# Patient Record
Sex: MALE | Race: WHITE | Employment: UNEMPLOYED | URBAN - METROPOLITAN AREA
[De-identification: names, ages, dates, MRNs, and addresses within clinical notes are randomized per-mention and may not be internally consistent; named-entity substitution may affect disease eponyms.]

---

## 2017-01-04 ENCOUNTER — OFFICE VISIT (OUTPATIENT)
Dept: PHYSICAL THERAPY | Facility: HOSPITAL | Age: 57
End: 2017-01-04
Attending: ORTHOPAEDIC SURGERY
Payer: MEDICAID

## 2017-01-04 PROCEDURE — 97140 MANUAL THERAPY 1/> REGIONS: CPT

## 2017-01-04 PROCEDURE — 97112 NEUROMUSCULAR REEDUCATION: CPT

## 2017-01-04 PROCEDURE — 97110 THERAPEUTIC EXERCISES: CPT

## 2017-01-04 NOTE — PROGRESS NOTES
Dx: C-spine radiculopathy s/p ACDF (11/8/16)         Authorized # of Visits:  4/7         Next MD visit: 1/17/17  Fall Risk: standard         Precautions: n/a            Medication Changes since last visit?: No    Subjective:    It took 4 days two recover f Sxs to allow for patient to perform activities without deviation asleep at night without disturbance from pain.  FAIR PROGRESS  5.  Independent with progressive HEP during and upon discharge from therapy to aide with Sx management and progress toward greate

## 2017-01-06 ENCOUNTER — OFFICE VISIT (OUTPATIENT)
Dept: PHYSICAL THERAPY | Facility: HOSPITAL | Age: 57
End: 2017-01-06
Attending: ORTHOPAEDIC SURGERY
Payer: MEDICAID

## 2017-01-06 PROCEDURE — 97140 MANUAL THERAPY 1/> REGIONS: CPT

## 2017-01-06 PROCEDURE — 97110 THERAPEUTIC EXERCISES: CPT

## 2017-01-06 NOTE — PROGRESS NOTES
Dx: C-spine radiculopathy s/p ACDF (11/8/16)         Authorized # of Visits:  5/7         Next MD visit: 1/17/17  Fall Risk: standard         Precautions: n/a            Medication Changes since last visit?: No    Subjective: \"It's been 3 days since I've sitting posture/tolerance to activity--IN PROGRESS  3. Patient able to sit using proper posture for up to or > 1 hour  to allow for patient to sit and watch a television show without increased pain--IN PROGRESS  4.  Minimal to no peripheralization of Sxs to

## 2017-01-11 ENCOUNTER — APPOINTMENT (OUTPATIENT)
Dept: PHYSICAL THERAPY | Facility: HOSPITAL | Age: 57
End: 2017-01-11
Attending: ORTHOPAEDIC SURGERY
Payer: MEDICAID

## 2017-01-19 ENCOUNTER — OFFICE VISIT (OUTPATIENT)
Dept: PHYSICAL THERAPY | Facility: HOSPITAL | Age: 57
End: 2017-01-19
Attending: ORTHOPAEDIC SURGERY
Payer: MEDICAID

## 2017-01-19 PROCEDURE — 97140 MANUAL THERAPY 1/> REGIONS: CPT

## 2017-01-19 PROCEDURE — 97110 THERAPEUTIC EXERCISES: CPT

## 2017-01-19 NOTE — PROGRESS NOTES
Dx: C-spine radiculopathy s/p ACDF (11/8/16)         Authorized # of Visits:  6/7         Next MD visit: 2/7/17  Fall Risk: standard         Precautions: n/a            Medication Changes since last visit?: YES    Subjective: \"I only slept for 10 hours fo progress toward greater/painfree functional. [place] --IN PROGRESS    Plan: Continue to monitor and progress as appropriate. Skilled Services:  Reassessment    Charges:  TherEx 1, MT x 2             Total Timed Treatment: 45 min

## 2017-01-20 ENCOUNTER — APPOINTMENT (OUTPATIENT)
Dept: PHYSICAL THERAPY | Facility: HOSPITAL | Age: 57
End: 2017-01-20
Attending: ORTHOPAEDIC SURGERY
Payer: MEDICAID

## 2017-01-23 ENCOUNTER — OFFICE VISIT (OUTPATIENT)
Dept: PHYSICAL THERAPY | Facility: HOSPITAL | Age: 57
End: 2017-01-23
Attending: ORTHOPAEDIC SURGERY
Payer: MEDICAID

## 2017-01-23 PROCEDURE — 97110 THERAPEUTIC EXERCISES: CPT

## 2017-01-23 NOTE — PROGRESS NOTES
Dx: C-spine radiculopathy s/p ACDF (11/8/16)         Authorized # of Visits: 7/7         Next MD visit: 2/7/17  Fall Risk: standard         Precautions: n/a            Medication Changes since last visit?: YES    Subjective:  \"I went on a vitamin kick and remain the same. Overall minimal functional improvement noted to date. Goals:    1.  Improve C-spine mobility by 10-15 degrees so that patient may have sufficient range to perform daily activities/drive without increased pain or deviation-- IN PROGRESS

## 2017-01-24 NOTE — PROGRESS NOTES
Patient Name: Rossy Ventura, : 1960, MRN: [id number]   Date:  2017  Referring Physician:  Florencio Patterson    Diagnosis:  C-spine radiculopathy s/p ACDF (16)            Progress Summary    Pt has attended 7 visits in Physical Therapy. patient may have sufficient range to perform daily activities/drive without increased pain or deviation-- IN PROGRESS  2. Improve Postural awareness to facilitate symptom management and allow for proper sitting posture/tolerance to activity--MET  3.  Patien

## 2017-01-26 ENCOUNTER — APPOINTMENT (OUTPATIENT)
Dept: PHYSICAL THERAPY | Facility: HOSPITAL | Age: 57
End: 2017-01-26
Attending: ORTHOPAEDIC SURGERY
Payer: MEDICAID

## 2017-01-30 ENCOUNTER — APPOINTMENT (OUTPATIENT)
Dept: PHYSICAL THERAPY | Facility: HOSPITAL | Age: 57
End: 2017-01-30
Attending: ORTHOPAEDIC SURGERY
Payer: MEDICAID

## 2017-03-29 ENCOUNTER — HOSPITAL ENCOUNTER (OUTPATIENT)
Dept: MRI IMAGING | Facility: HOSPITAL | Age: 57
Discharge: HOME | End: 2017-03-29
Attending: ORTHOPAEDIC SURGERY
Payer: OTHER MISCELLANEOUS

## 2017-03-29 DIAGNOSIS — M54.10 RADICULOPATHY, UNSPECIFIED SPINAL REGION: ICD-10-CM

## 2017-03-29 PROCEDURE — 72156 MRI NECK SPINE W/O & W/DYE: CPT

## 2017-03-29 PROCEDURE — A9575 INJ GADOTERATE MEGLUMI 0.1ML: HCPCS | Performed by: ORTHOPAEDIC SURGERY

## 2017-05-22 ENCOUNTER — OFFICE VISIT (OUTPATIENT)
Dept: PAIN CLINIC | Facility: HOSPITAL | Age: 57
End: 2017-05-22
Attending: ANESTHESIOLOGY
Payer: COMMERCIAL

## 2017-05-22 VITALS
HEIGHT: 66.5 IN | BODY MASS INDEX: 30.17 KG/M2 | WEIGHT: 190 LBS | RESPIRATION RATE: 18 BRPM | SYSTOLIC BLOOD PRESSURE: 122 MMHG | HEART RATE: 96 BPM | DIASTOLIC BLOOD PRESSURE: 79 MMHG

## 2017-05-22 DIAGNOSIS — M50.30 DDD (DEGENERATIVE DISC DISEASE), CERVICAL: ICD-10-CM

## 2017-05-22 DIAGNOSIS — M96.1 POSTLAMINECTOMY SYNDROME, CERVICAL: ICD-10-CM

## 2017-05-22 DIAGNOSIS — M54.12 CERVICAL RADICULOPATHY: Primary | ICD-10-CM

## 2017-05-22 PROCEDURE — 99201 HC OUTPT EVAL AND MGNT NEW PT LEVEL 1: CPT

## 2017-05-22 RX ORDER — CARISOPRODOL 350 MG/1
350 TABLET ORAL 3 TIMES DAILY PRN
COMMUNITY
End: 2017-06-26

## 2017-05-22 RX ORDER — GABAPENTIN 300 MG/1
300 CAPSULE ORAL 3 TIMES DAILY
Qty: 90 CAPSULE | Refills: 0 | Status: SHIPPED | OUTPATIENT
Start: 2017-05-22 | End: 2017-06-14

## 2017-05-22 RX ORDER — HYDROCODONE BITARTRATE AND ACETAMINOPHEN 10; 325 MG/1; MG/1
1 TABLET ORAL EVERY 6 HOURS PRN
Qty: 120 TABLET | Refills: 0 | Status: SHIPPED | OUTPATIENT
Start: 2017-05-22 | End: 2017-06-14

## 2017-05-22 RX ORDER — TRAMADOL HYDROCHLORIDE 50 MG/1
100 TABLET ORAL EVERY 8 HOURS PRN
COMMUNITY
End: 2017-05-22

## 2017-05-22 NOTE — PROGRESS NOTES
discectomy and decompression and fusion at the cervical spine by Dr. Arti Paez 11/2016    Alexx Bowser 89    05/22/17  PRESENTS AMBULATORY TO CPM;  NEW CONSULT C/O CERVICAL NECK PAIN RADICULOPATHY LT SHLDR & DOWN LEXIE

## 2017-05-22 NOTE — PROGRESS NOTES
Recommend continuing physical therapy as recommended by Dr. Emerson Mathur Will start gabapentin 300mg TID, instructions given for titration.  Will restart norco 10/325 up to 4 tabs per day.  Patient instructed to discontinue tramadol.  Patient to follow up in

## 2017-05-22 NOTE — CHRONIC PAIN
Initial Consultation Note      HISTORY OF PRESENT ILLNESS:  Eze Wyatt is a 61year old old male referred to the pain clinic by Dr. Kimmie Moon for neck pain . The pain radiates into the upper extremity.   He reports he had an initial surgery with good re bleeding  Lymphatic: No current lymphedema  Allergic/Immunologic:  Negative  Musculoskeletal: As above  Neurological: As above      MEDICAL HISTORY:  Patient Active Problem List:     Nasal septal deviation     Hypertrophy, nasal, turbinate     Stenosis of Extensors 5/5 5/5   Intrinsic Hand 5/5 5/5    5/5 5/5     LOWER EXTREMITY      LEFT RIGHT   Iliopsoas 5/5 5/5   Quadriceps 5/5 5/5   Foot DF 5/5 5/5   Foot EHL 5/5 5/5   Gastrocnemius 5/5 5/5   Hoffmans: negative    Joint Exam: Normal  TPs:  Present wi which deforms the thecal sac, without cord signal or cord enhancement. There is moderate canal narrowing. C5-C6:  Fused. Minimal endplate osteophyte formation. No significant central canal or neuroforaminal narrowing. C6-C7:  Fused.  Minimal end plate ost up in 1 month, sooner if warranted. If opioids continued, would need to sign an opioid agreement at that time, as well has obtaining a urine drug screen.

## 2017-06-15 RX ORDER — GABAPENTIN 300 MG/1
300 CAPSULE ORAL 3 TIMES DAILY
Qty: 90 CAPSULE | Refills: 2 | Status: SHIPPED | OUTPATIENT
Start: 2017-06-16 | End: 2017-08-01

## 2017-06-15 RX ORDER — HYDROCODONE BITARTRATE AND ACETAMINOPHEN 10; 325 MG/1; MG/1
1 TABLET ORAL EVERY 6 HOURS PRN
Qty: 120 TABLET | Refills: 0 | Status: SHIPPED | OUTPATIENT
Start: 2017-06-16 | End: 2017-06-26

## 2017-06-26 NOTE — PROGRESS NOTES
discectomy and decompression and fusion at the cervical spine by Dr. Reyna Hoyos 11/2016    Alexx Bowser 89    05/22/17  PRESENTS AMBULATORY TO CPM;  NEW CONSULT C/O CERVICAL NECK PAIN RADICULOPATHY LT SHLDR & DOWN LEXIE

## 2017-06-26 NOTE — CHRONIC PAIN
[facility]: Follow up Note      HISTORY OF PRESENT ILLNESS:  Sulma Olivia is a 61year old old male referred to the pain clinic by Dr. Ken Cam for neck pain . The pain radiates into the upper extremity.   He reports he had an initial blossom List:     Nasal septal deviation     Hypertrophy, nasal, turbinate     Stenosis of cervical spine with myelopathy    Past Medical History:   Diagnosis Date   • Other and unspecified hyperlipidemia        FAMILY HISTORY:  Family History   Problem Relation A gadolinium injection.             FINDINGS:         CRANIOCERVICAL AREA:       Normal foramen magnum with no Chiari malformation.     PARASPINAL AREA:    Normal with no visible mass.     BONES:           Prior history cervical discectomy fusion at C5-C6, w 4.15 (L) 11/09/2016   HGB 12.6 (L) 11/09/2016   HCT 36.5 (L) 11/09/2016   MCV 87.9 11/09/2016   MCH 30.3 11/09/2016   MCHC 34.5 11/09/2016   RDW 13.1 11/09/2016    11/09/2016   MPV 7.3 (L) 11/09/2016       Lab Results  Component Value Date

## 2017-07-19 ENCOUNTER — TELEPHONE (OUTPATIENT)
Dept: PAIN CLINIC | Facility: HOSPITAL | Age: 57
End: 2017-07-19

## 2017-08-02 RX ORDER — GABAPENTIN 300 MG/1
300 CAPSULE ORAL 3 TIMES DAILY
Qty: 90 CAPSULE | Refills: 2 | Status: SHIPPED | OUTPATIENT
Start: 2017-08-02 | End: 2018-01-08

## 2017-08-21 ENCOUNTER — OFFICE VISIT (OUTPATIENT)
Dept: PAIN CLINIC | Facility: HOSPITAL | Age: 57
End: 2017-08-21
Attending: NURSE PRACTITIONER
Payer: COMMERCIAL

## 2017-08-21 DIAGNOSIS — G99.2 STENOSIS OF CERVICAL SPINE WITH MYELOPATHY (HCC): Primary | ICD-10-CM

## 2017-08-21 DIAGNOSIS — M48.02 STENOSIS OF CERVICAL SPINE WITH MYELOPATHY (HCC): Primary | ICD-10-CM

## 2017-08-21 PROCEDURE — 99211 OFF/OP EST MAY X REQ PHY/QHP: CPT

## 2017-08-21 RX ORDER — HYDROCODONE BITARTRATE AND ACETAMINOPHEN 10; 325 MG/1; MG/1
1 TABLET ORAL EVERY 6 HOURS PRN
Qty: 120 TABLET | Refills: 0 | Status: SHIPPED | OUTPATIENT
Start: 2017-09-20 | End: 2017-10-03

## 2017-08-21 RX ORDER — HYDROCODONE BITARTRATE AND ACETAMINOPHEN 10; 325 MG/1; MG/1
1 TABLET ORAL EVERY 6 HOURS PRN
Qty: 120 TABLET | Refills: 0 | Status: SHIPPED | OUTPATIENT
Start: 2017-08-21 | End: 2017-09-20

## 2017-08-21 NOTE — PROGRESS NOTES
Patient presents to [facility] ambulatory for med eval.  We have been treating him for neck pain that radiates to his left shoulder and down his left arm with numbness and tingling.   His pain is a 7/10 today and he just took his norco,  May not have kicked in yet

## 2017-08-21 NOTE — CHRONIC PAIN
[facility]: Follow up Note    HISTORY OF PRESENT ILLNESS:  Aniyah Eastman is a 61year old old male referred to the pain clinic by Dr. Ruth Quiñonez for neck pain. He continues to report neck pain that radiates into the upper extremity.    He sta Stenosis of cervical spine with myelopathy    Past Medical History:   Diagnosis Date   • Other and unspecified hyperlipidemia        FAMILY HISTORY:  Family History   Problem Relation Age of Onset   • Other[other] [OTHER] Father      Renal transplant   • D no visible mass.     BONES:           Prior history cervical discectomy fusion at C5-C6, with evidence for prior hardware removal from C7. The hardware causes susceptibility artifact limiting assessment of adjacent structures.  There is straightening of nor 208 11/09/2016   MPV 7.3 (L) 11/09/2016       Lab Results  Component Value Date    (L) 11/09/2016   K 3.8 11/09/2016    11/09/2016   CO2 26 11/09/2016   BUN 13 11/09/2016    (H) 11/09/2016   CA 8.6 11/09/2016     No results found for: PT

## 2017-10-04 RX ORDER — METHYLPREDNISOLONE 4 MG/1
TABLET ORAL
Qty: 1 PACKAGE | Refills: 0 | Status: SHIPPED | OUTPATIENT
Start: 2017-10-04 | End: 2017-12-15 | Stop reason: ALTCHOICE

## 2017-10-04 RX ORDER — HYDROCODONE BITARTRATE AND ACETAMINOPHEN 10; 325 MG/1; MG/1
1 TABLET ORAL EVERY 6 HOURS PRN
Qty: 120 TABLET | Refills: 0 | Status: SHIPPED | OUTPATIENT
Start: 2017-10-19 | End: 2017-10-19

## 2017-10-19 ENCOUNTER — OFFICE VISIT (OUTPATIENT)
Dept: PAIN CLINIC | Facility: HOSPITAL | Age: 57
End: 2017-10-19
Attending: NURSE PRACTITIONER
Payer: COMMERCIAL

## 2017-10-19 VITALS
DIASTOLIC BLOOD PRESSURE: 80 MMHG | BODY MASS INDEX: 30.17 KG/M2 | HEIGHT: 66.6 IN | RESPIRATION RATE: 18 BRPM | WEIGHT: 190 LBS | SYSTOLIC BLOOD PRESSURE: 120 MMHG

## 2017-10-19 DIAGNOSIS — M48.02 STENOSIS OF CERVICAL SPINE WITH MYELOPATHY (HCC): Primary | ICD-10-CM

## 2017-10-19 DIAGNOSIS — G99.2 STENOSIS OF CERVICAL SPINE WITH MYELOPATHY (HCC): Primary | ICD-10-CM

## 2017-10-19 PROCEDURE — 99211 OFF/OP EST MAY X REQ PHY/QHP: CPT

## 2017-10-19 RX ORDER — HYDROCODONE BITARTRATE AND ACETAMINOPHEN 10; 325 MG/1; MG/1
1 TABLET ORAL EVERY 6 HOURS PRN
Qty: 120 TABLET | Refills: 0 | Status: SHIPPED | OUTPATIENT
Start: 2017-11-18 | End: 2017-12-15

## 2017-10-19 RX ORDER — HYDROCODONE BITARTRATE AND ACETAMINOPHEN 10; 325 MG/1; MG/1
1 TABLET ORAL EVERY 6 HOURS PRN
Qty: 120 TABLET | Refills: 0 | Status: SHIPPED | OUTPATIENT
Start: 2017-10-19 | End: 2017-11-18

## 2017-10-19 RX ORDER — PREGABALIN 50 MG/1
50 CAPSULE ORAL 3 TIMES DAILY
Qty: 90 CAPSULE | Refills: 0 | Status: SHIPPED | OUTPATIENT
Start: 2017-10-19 | End: 2017-11-18

## 2017-10-19 NOTE — CHRONIC PAIN
MEDICATION EVALUATION  HISTORY OF PRESENT ILLNESS:  Lydia Hill is a 61year old old male with history of Stenosis of cervical spine with myelopathy  (primary encounter diagnosis) who returns for medication evaluation.  Patient continues to report neck above  Coughing/sneezing/straining does not exacerbate the pain.   Numbness/tingling: as above  Weakness: as above  Weight Loss: Negative   Fever: Negative   Cardiovascular:  No current chest pain or palpitations   Respiratory:  No current shortness of nadia 10/19/17  1156   BP: 120/80   Resp: 18      General: Alert and oriented x3  Affect:  NAD  Gait: Normal;  cane user - No  Spine: Normal    ROM:   CERVICAL SPINE    Degree Pain   Flexion 20 Yes    Extension 10 yes                       MOTOR EXAMINATION:  UP Recommended integrative medicine clinic- referral given  7. Discussed injection therapy. Patient will switch insurance in the next month. Will try to get injection approved after that.           Pt will return to clinic for follow up before the next medicat
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
## 2017-12-15 ENCOUNTER — OFFICE VISIT (OUTPATIENT)
Dept: PAIN CLINIC | Facility: HOSPITAL | Age: 57
End: 2017-12-15
Attending: NURSE PRACTITIONER
Payer: COMMERCIAL

## 2017-12-15 VITALS
SYSTOLIC BLOOD PRESSURE: 118 MMHG | BODY MASS INDEX: 28.93 KG/M2 | HEIGHT: 66 IN | HEART RATE: 78 BPM | DIASTOLIC BLOOD PRESSURE: 70 MMHG | WEIGHT: 180 LBS

## 2017-12-15 DIAGNOSIS — M48.02 STENOSIS OF CERVICAL SPINE WITH MYELOPATHY (HCC): Primary | ICD-10-CM

## 2017-12-15 DIAGNOSIS — G99.2 STENOSIS OF CERVICAL SPINE WITH MYELOPATHY (HCC): Primary | ICD-10-CM

## 2017-12-15 PROCEDURE — 99211 OFF/OP EST MAY X REQ PHY/QHP: CPT

## 2017-12-15 RX ORDER — HYDROCODONE BITARTRATE AND ACETAMINOPHEN 10; 325 MG/1; MG/1
1 TABLET ORAL EVERY 6 HOURS PRN
Qty: 120 TABLET | Refills: 0 | Status: SHIPPED | OUTPATIENT
Start: 2018-01-17 | End: 2018-01-11

## 2017-12-15 RX ORDER — DULOXETIN HYDROCHLORIDE 30 MG/1
30 CAPSULE, DELAYED RELEASE ORAL DAILY
Qty: 30 CAPSULE | Refills: 0 | Status: SHIPPED | OUTPATIENT
Start: 2017-12-15 | End: 2018-01-08

## 2017-12-15 RX ORDER — NORTRIPTYLINE HYDROCHLORIDE 10 MG/1
10 CAPSULE ORAL NIGHTLY
Qty: 30 CAPSULE | Refills: 0 | Status: SHIPPED | OUTPATIENT
Start: 2017-12-15 | End: 2018-01-08

## 2017-12-15 RX ORDER — HYDROCODONE BITARTRATE AND ACETAMINOPHEN 10; 325 MG/1; MG/1
1 TABLET ORAL EVERY 6 HOURS PRN
Qty: 120 TABLET | Refills: 0 | Status: SHIPPED | OUTPATIENT
Start: 2017-12-18 | End: 2018-01-11

## 2017-12-15 NOTE — CHRONIC PAIN
MEDICATION EVALUATION  HISTORY OF PRESENT ILLNESS:  Fabienne Heard is a 61year old old male with history of Stenosis of cervical spine with myelopathy  (primary encounter diagnosis) who returns for medication evaluation.  Patient continues to report neck Disp: 90 capsule Rfl: 2        REVIEW OF SYSTEMS:   Bowel/Bladder Incontinence: as above  Coughing/sneezing/straining does not exacerbate the pain.   Numbness/tingling: as above  Weakness: as above  Weight Loss: Negative   Fever: Negative   Cardiovascular: not wish/unable to name a surrogate decision maker.   PHYSICAL EXAMINATION:   12/15/17  1300   BP: 118/70   Pulse: 78      General: Alert and oriented x3  Affect:  NAD  Gait: Normal;  cane user - No  Spine: Normal    ROM:   CERVICAL SPINE    Degree Pain   F patient. He declines behavioral health consult at this time  6. Trial elavil and cymbalta. He will update us in 30 days if relief. If no relief consider lyrica. 7. Discussed injection therapy.  His insurance is currently not allowing injections until fail

## 2018-01-08 RX ORDER — DULOXETIN HYDROCHLORIDE 30 MG/1
CAPSULE, DELAYED RELEASE ORAL
Qty: 30 CAPSULE | Refills: 2 | Status: SHIPPED | OUTPATIENT
Start: 2018-01-08 | End: 2018-01-11

## 2018-01-08 RX ORDER — NORTRIPTYLINE HYDROCHLORIDE 10 MG/1
CAPSULE ORAL
Qty: 30 CAPSULE | Refills: 2 | Status: SHIPPED | OUTPATIENT
Start: 2018-01-08 | End: 2018-08-05

## 2018-01-09 RX ORDER — GABAPENTIN 300 MG/1
CAPSULE ORAL
Qty: 90 CAPSULE | Refills: 2 | Status: SHIPPED | OUTPATIENT
Start: 2018-01-09 | End: 2018-06-01

## 2018-01-11 ENCOUNTER — OFFICE VISIT (OUTPATIENT)
Dept: PAIN CLINIC | Facility: HOSPITAL | Age: 58
End: 2018-01-11
Attending: ANESTHESIOLOGY
Payer: COMMERCIAL

## 2018-01-11 VITALS — DIASTOLIC BLOOD PRESSURE: 84 MMHG | HEART RATE: 85 BPM | SYSTOLIC BLOOD PRESSURE: 125 MMHG

## 2018-01-11 DIAGNOSIS — G99.2 STENOSIS OF CERVICAL SPINE WITH MYELOPATHY (HCC): Primary | ICD-10-CM

## 2018-01-11 DIAGNOSIS — M48.02 STENOSIS OF CERVICAL SPINE WITH MYELOPATHY (HCC): Primary | ICD-10-CM

## 2018-01-11 PROCEDURE — 99211 OFF/OP EST MAY X REQ PHY/QHP: CPT

## 2018-01-11 RX ORDER — HYDROCODONE BITARTRATE AND ACETAMINOPHEN 10; 325 MG/1; MG/1
1 TABLET ORAL EVERY 6 HOURS PRN
Qty: 120 TABLET | Refills: 0 | Status: SHIPPED | OUTPATIENT
Start: 2018-03-13 | End: 2018-04-03

## 2018-01-11 RX ORDER — HYDROCODONE BITARTRATE AND ACETAMINOPHEN 10; 325 MG/1; MG/1
1 TABLET ORAL EVERY 6 HOURS PRN
Qty: 120 TABLET | Refills: 0 | Status: SHIPPED | OUTPATIENT
Start: 2018-01-14 | End: 2018-02-13

## 2018-01-11 RX ORDER — HYDROCODONE BITARTRATE AND ACETAMINOPHEN 10; 325 MG/1; MG/1
1 TABLET ORAL EVERY 6 HOURS PRN
Qty: 120 TABLET | Refills: 0 | Status: SHIPPED | OUTPATIENT
Start: 2018-02-12 | End: 2018-03-14

## 2018-01-11 NOTE — PROGRESS NOTES
Patient presents to [facility] ambulatory for med eval.  He has neck pain radiating to the left arm. He takes norco for pain and it is not helping. He has been taking 5 per day prescribed 4 per day. He states \" I will be out in 3 days.  \"  He did a trial of c

## 2018-01-11 NOTE — CHRONIC PAIN
MEDICATION EVALUATION  HISTORY OF PRESENT ILLNESS:  Sulma Olivia is a 61year old old male with history of Stenosis of cervical spine with myelopathy  (primary encounter diagnosis) who returns for medication evaluation.  Patient continues to report neck MG Oral Cap TAKE ONE CAPSULE BY MOUTH THREE TIMES DAILY Disp: 90 capsule Rfl: 2   NORTRIPTYLINE HCL 10 MG Oral Cap TAKE 1 CAPSULE(10 MG) BY MOUTH EVERY NIGHT Disp: 30 capsule Rfl: 2        REVIEW OF SYSTEMS:   Bowel/Bladder Incontinence: as above  Coughing History Narrative    The patient does not use an assistive device. .      The patient does live in a home with stairs. ADVANCE CARE PLANNING:    The patient did not wish/unable to name a surrogate decision maker.   PHYSICAL EXAMINATION:   01/11/18  1303 vehicles while taking this medication.  Patient verbalized understanding.  Patient updated regarding refill policy. He verbalized understanding.     - Smoking cessation- uses electronic cigarettes currently.  Recommended complete cessation.   -  PHQ9 + , di

## 2018-04-03 ENCOUNTER — OFFICE VISIT (OUTPATIENT)
Dept: PAIN CLINIC | Facility: HOSPITAL | Age: 58
End: 2018-04-03
Attending: ANESTHESIOLOGY
Payer: COMMERCIAL

## 2018-04-03 VITALS
HEIGHT: 66 IN | BODY MASS INDEX: 28.13 KG/M2 | DIASTOLIC BLOOD PRESSURE: 81 MMHG | HEART RATE: 105 BPM | WEIGHT: 175 LBS | SYSTOLIC BLOOD PRESSURE: 142 MMHG

## 2018-04-03 DIAGNOSIS — M48.02 STENOSIS OF CERVICAL SPINE WITH MYELOPATHY (HCC): Primary | ICD-10-CM

## 2018-04-03 DIAGNOSIS — G99.2 STENOSIS OF CERVICAL SPINE WITH MYELOPATHY (HCC): Primary | ICD-10-CM

## 2018-04-03 PROCEDURE — 99211 OFF/OP EST MAY X REQ PHY/QHP: CPT

## 2018-04-03 RX ORDER — LIDOCAINE 50 MG/G
1 PATCH TOPICAL EVERY 24 HOURS
Qty: 30 PATCH | Refills: 0 | Status: SHIPPED | OUTPATIENT
Start: 2018-04-03 | End: 2018-05-03

## 2018-04-03 RX ORDER — LIDOCAINE 50 MG/G
1 PATCH TOPICAL EVERY 24 HOURS
Qty: 30 PATCH | Refills: 0 | Status: SHIPPED | OUTPATIENT
Start: 2018-04-03 | End: 2018-04-03

## 2018-04-03 RX ORDER — HYDROCODONE BITARTRATE AND ACETAMINOPHEN 10; 325 MG/1; MG/1
1 TABLET ORAL EVERY 6 HOURS PRN
Qty: 120 TABLET | Refills: 0 | Status: SHIPPED | OUTPATIENT
Start: 2018-04-12 | End: 2018-04-03

## 2018-04-03 RX ORDER — ASCORBIC ACID 500 MG
500 TABLET ORAL DAILY
COMMUNITY

## 2018-04-03 RX ORDER — HYDROCODONE BITARTRATE AND ACETAMINOPHEN 10; 325 MG/1; MG/1
1 TABLET ORAL EVERY 6 HOURS PRN
Qty: 120 TABLET | Refills: 0 | Status: SHIPPED | OUTPATIENT
Start: 2018-05-12 | End: 2018-06-01

## 2018-04-03 NOTE — CHRONIC PAIN
[facility]  Pain Clinic  Follow Up Visit Report       Patient name: Alan Dodge 61year old male  : 1960  MRN: [id number]  Referring MD: No ref.  provider found     COMPLAINT:  Patient presents with:  Medication Eval: LEFT ARM P patch onto the skin daily. , Disp: 30 patch, Rfl: 0  •  GABAPENTIN 300 MG Oral Cap, TAKE ONE CAPSULE BY MOUTH THREE TIMES DAILY (Patient taking differently: TWO CAPSULES BY MOUTH THREE TIMES DAILY), Disp: 90 capsule, Rfl: 2  •  NORTRIPTYLINE HCL 10 MG Oral

## 2018-04-03 NOTE — PROGRESS NOTES
NEW CONSULT 5-22-17 FOR C/O NECK PAIN RADICULOPATHY LT SHLDR & DOWN LUE;  CAUSING NUMB/TING/WEAKNESS IN LUE;  PT STATES HE HAS PMH OF INJECTION THERAPY BUT CONTINUED TO HAVE CERVICAL PAIN;  IN NOV. 2016  HAD CERVICAL DISCECTOMY , DECOMPRESSION & FUSION BY

## 2018-06-01 ENCOUNTER — OFFICE VISIT (OUTPATIENT)
Dept: PAIN CLINIC | Facility: HOSPITAL | Age: 58
End: 2018-06-01
Attending: NURSE PRACTITIONER
Payer: COMMERCIAL

## 2018-06-01 VITALS — DIASTOLIC BLOOD PRESSURE: 74 MMHG | HEART RATE: 84 BPM | SYSTOLIC BLOOD PRESSURE: 125 MMHG

## 2018-06-01 DIAGNOSIS — G99.2 STENOSIS OF CERVICAL SPINE WITH MYELOPATHY (HCC): Primary | ICD-10-CM

## 2018-06-01 DIAGNOSIS — M48.02 STENOSIS OF CERVICAL SPINE WITH MYELOPATHY (HCC): Primary | ICD-10-CM

## 2018-06-01 PROCEDURE — 99211 OFF/OP EST MAY X REQ PHY/QHP: CPT

## 2018-06-01 RX ORDER — GABAPENTIN 600 MG/1
600 TABLET ORAL 3 TIMES DAILY
Qty: 90 TABLET | Refills: 0 | Status: SHIPPED | OUTPATIENT
Start: 2018-06-01 | End: 2018-07-23

## 2018-06-01 RX ORDER — HYDROCODONE BITARTRATE AND ACETAMINOPHEN 10; 325 MG/1; MG/1
1 TABLET ORAL EVERY 6 HOURS PRN
Qty: 120 TABLET | Refills: 0 | Status: SHIPPED | OUTPATIENT
Start: 2018-06-09 | End: 2018-07-09

## 2018-06-01 RX ORDER — HYDROCODONE BITARTRATE AND ACETAMINOPHEN 10; 325 MG/1; MG/1
1 TABLET ORAL EVERY 6 HOURS PRN
Qty: 120 TABLET | Refills: 0 | Status: SHIPPED | OUTPATIENT
Start: 2018-07-08 | End: 2018-08-02

## 2018-06-01 NOTE — PROGRESS NOTES
Patient presents to [facility] ambulatory for med eval.  He has chronic neck pain that radiates to his left arm the most pain is in his elbow and he cannot straighten his arm. He is weak in the arm as well.   He is currently taking norco 4 per day but he states i

## 2018-06-01 NOTE — CHRONIC PAIN
MEDICATION EVALUATION  HISTORY OF PRESENT ILLNESS:  Alex Webster is a 61year old old male with history of Stenosis of cervical spine with myelopathy  (primary encounter diagnosis) who returns for medication evaluation.  Patient continues to report neck Tab Take 1 tablet (600 mg total) by mouth 3 (three) times daily. Disp: 90 tablet Rfl: 0   Acetaminophen (TYLENOL ARTHRITIS EXT RELIEF OR) Take 1 tablet by mouth daily as needed. Disp:  Rfl:    Vitamin C 500 MG Oral Tab Take 500 mg by mouth daily.  Disp:  Rf 1.00 Packs/day  For 43.00 Years     Types: Cigarettes    Smokeless tobacco: Never Used    Comment: vaping smokeless daily    Alcohol use No    Drug use: No    Sexual activity: Not on file     Other Topics Concern    Caffeine Concern Yes    Comment: 2 cups for visualization of suspected pathology prior to and after intravenous gadolinium injection. FINDINGS:          CRANIOCERVICAL AREA:         Normal foramen magnum with no Chiari malformation. PARASPINAL AREA:     Normal with no visible mass. PHYSICIAN MONITORING PROGRAM REVIEWED  Yes      ASSESSMENT AND PLAN:    Sussy Townsend is a 61year old  male, with  History of Stenosis of cervical spine with myelopathy  (primary encounter diagnosis).  Patient continues to have worsening pain despite mul

## 2018-07-23 RX ORDER — GABAPENTIN 600 MG/1
TABLET ORAL
Qty: 90 TABLET | Refills: 2 | Status: SHIPPED | OUTPATIENT
Start: 2018-07-23 | End: 2018-10-21

## 2018-08-02 ENCOUNTER — OFFICE VISIT (OUTPATIENT)
Dept: PAIN CLINIC | Facility: HOSPITAL | Age: 58
End: 2018-08-02
Attending: NURSE PRACTITIONER
Payer: COMMERCIAL

## 2018-08-02 DIAGNOSIS — M48.02 STENOSIS OF CERVICAL SPINE WITH MYELOPATHY (HCC): Primary | ICD-10-CM

## 2018-08-02 DIAGNOSIS — G99.2 STENOSIS OF CERVICAL SPINE WITH MYELOPATHY (HCC): Primary | ICD-10-CM

## 2018-08-02 PROCEDURE — 99211 OFF/OP EST MAY X REQ PHY/QHP: CPT

## 2018-08-02 RX ORDER — HYDROCODONE BITARTRATE AND ACETAMINOPHEN 10; 325 MG/1; MG/1
1 TABLET ORAL EVERY 6 HOURS PRN
Qty: 120 TABLET | Refills: 0 | Status: SHIPPED | OUTPATIENT
Start: 2018-09-04 | End: 2018-10-01

## 2018-08-02 RX ORDER — HYDROCODONE BITARTRATE AND ACETAMINOPHEN 10; 325 MG/1; MG/1
1 TABLET ORAL EVERY 6 HOURS PRN
Qty: 120 TABLET | Refills: 0 | Status: SHIPPED | OUTPATIENT
Start: 2018-08-06 | End: 2018-09-05

## 2018-08-02 NOTE — PROGRESS NOTES
Patient presents to [facility] ambulatory for med eval.  He has chronic neck pain that radiates to his left arm, with numbness and tingling and weakness in his arm. He takes norco 4 per day and it doesn't help much per patient. He takes elavil and gabapentin.

## 2018-08-02 NOTE — CHRONIC PAIN
MEDICATION EVALUATION  HISTORY OF PRESENT ILLNESS:  Eddie Nath is a 61year old old male with history of Stenosis of cervical spine with myelopathy  (primary encounter diagnosis) who returns for medication evaluation.  Patient continues to report neck tablet Rfl: 0   [START ON 8/6/2018] HYDROcodone-acetaminophen  MG Oral Tab Take 1 tablet by mouth every 6 (six) hours as needed for Pain.  Disp: 120 tablet Rfl: 0   GABAPENTIN 600 MG Oral Tab TAKE 1 TABLET(600 MG) BY MOUTH THREE TIMES DAILY Disp: 90 t status:   Spouse name: N/A    Years of education: N/A  Number of children: N/A     Occupational History  None on file     Social History Main Topics   Smoking status: Former Smoker  1.00 Packs/day  For 43.00 Years     Types: Cigarettes    Smokeless imaging planes and parameters were utilized for visualization of suspected pathology prior to and after intravenous gadolinium injection. FINDINGS:          CRANIOCERVICAL AREA:         Normal foramen magnum with no Chiari malformation.     PARASPI narrowing is most advanced at C6-7. [place] PHYSICIAN MONITORING PROGRAM REVIEWED  Yes      ASSESSMENT AND PLAN:    Faustino Lara is a 61year old  male, with  History of Stenosis of cervical spine with myelopathy  (primary encounter diagnosis).  Patient co

## 2018-08-06 RX ORDER — NORTRIPTYLINE HYDROCHLORIDE 10 MG/1
CAPSULE ORAL
Qty: 30 CAPSULE | Refills: 2 | Status: SHIPPED | OUTPATIENT
Start: 2018-08-06 | End: 2018-11-06

## 2018-08-28 RX ORDER — GABAPENTIN 300 MG/1
CAPSULE ORAL
Qty: 90 CAPSULE | Refills: 0 | OUTPATIENT
Start: 2018-08-28

## 2018-10-01 ENCOUNTER — OFFICE VISIT (OUTPATIENT)
Dept: PAIN CLINIC | Facility: HOSPITAL | Age: 58
End: 2018-10-01
Attending: NURSE PRACTITIONER
Payer: COMMERCIAL

## 2018-10-01 DIAGNOSIS — M48.02 STENOSIS OF CERVICAL SPINE WITH MYELOPATHY (HCC): Primary | ICD-10-CM

## 2018-10-01 DIAGNOSIS — G99.2 STENOSIS OF CERVICAL SPINE WITH MYELOPATHY (HCC): Primary | ICD-10-CM

## 2018-10-01 PROCEDURE — 99211 OFF/OP EST MAY X REQ PHY/QHP: CPT

## 2018-10-01 RX ORDER — HYDROCODONE BITARTRATE AND ACETAMINOPHEN 10; 325 MG/1; MG/1
1 TABLET ORAL EVERY 6 HOURS PRN
Qty: 120 TABLET | Refills: 0 | Status: SHIPPED | OUTPATIENT
Start: 2018-10-03 | End: 2018-11-02

## 2018-10-01 RX ORDER — LIDOCAINE 50 MG/G
1 PATCH TOPICAL EVERY 24 HOURS
Qty: 5 PATCH | Refills: 2 | Status: SHIPPED | OUTPATIENT
Start: 2018-10-01

## 2018-10-01 RX ORDER — HYDROCODONE BITARTRATE AND ACETAMINOPHEN 10; 325 MG/1; MG/1
1 TABLET ORAL EVERY 6 HOURS PRN
Qty: 120 TABLET | Refills: 0 | Status: SHIPPED | OUTPATIENT
Start: 2018-11-01 | End: 2018-11-23

## 2018-10-01 RX ORDER — GABAPENTIN 100 MG/1
900 CAPSULE ORAL 3 TIMES DAILY
Qty: 90 CAPSULE | Refills: 1 | Status: SHIPPED | OUTPATIENT
Start: 2018-10-01 | End: 2018-10-22 | Stop reason: DRUGHIGH

## 2018-10-01 NOTE — CHRONIC PAIN
MEDICATION EVALUATION  HISTORY OF PRESENT ILLNESS:  Eddie Nath is a 61year old old male with history of Stenosis of cervical spine with myelopathy  (primary encounter diagnosis) who returns for medication evaluation.  Patient continues to report neck ARTHRITIS EXT RELIEF OR) Take 1 tablet by mouth daily as needed. Disp:  Rfl:    Vitamin C 500 MG Oral Tab Take 500 mg by mouth daily. Disp:  Rfl:    Multiple Vitamins-Minerals (EMERGEN-C VITAMIN C OR) Take 1 tablet by mouth daily.  Disp:  Rfl:         Bret Trinidad medical: Not on file      Transportation needs - non-medical: Not on file    Occupational History      Not on file    Tobacco Use      Smoking status: Former Smoker        Packs/day: 1.00        Years: 43.00        Pack years: 37        Types: Cigarettes CERVICAL (W+WO) (CPT=72156)     COMPARISON: [facility], XR CERVICAL SPINE (2 VIEWS) (CPT=72040), 11/09/2016, 9:24. [address], 6/16/2016, 18:38.      INDICATIONS:  Neck pain, left arm weakness Approved by (CST): Jose Bell MD on 3/29/2017 at 17:57          =====  CONCLUSION:   1. Prior C5-C6 ventral cervical fusion, with evidence for previous fusion hardware located at the C7 level.   2. Disc disease and osteoarthritis of the cervical spine a

## 2018-10-01 NOTE — PROGRESS NOTES
Patient presents to [facility] ambulatory for med eval.  He has chronic left neck pain that radiates down his left arm with the most pain in his elbow. He recently increased gabapentin and has started to have more feeling in his left hand.   He does some stretchi

## 2018-10-22 RX ORDER — GABAPENTIN 600 MG/1
TABLET ORAL
Qty: 90 TABLET | Refills: 2 | Status: SHIPPED | OUTPATIENT
Start: 2018-10-22 | End: 2019-01-03

## 2018-11-06 RX ORDER — NORTRIPTYLINE HYDROCHLORIDE 10 MG/1
CAPSULE ORAL
Qty: 90 CAPSULE | Refills: 0 | Status: SHIPPED | OUTPATIENT
Start: 2018-11-06 | End: 2019-01-22

## 2018-11-26 NOTE — CHRONIC PAIN
MEDICATION EVALUATION  HISTORY OF PRESENT ILLNESS:  Alessia Ascencio is a 60year old old male with history of Chronic, continuous use of opioids  (primary encounter diagnosis)  Stenosis of cervical spine with myelopathy who returns for medication evaluatio Vitamins-Minerals (EMERGEN-C VITAMIN C OR) Take 1 tablet by mouth daily. Disp:  Rfl:         REVIEW OF SYSTEMS:   Bowel/Bladder Incontinence: as above  Coughing/sneezing/straining does not exacerbate the pain.   Numbness/tingling: as above  Weakness: left a Packs/day: 1.00        Years: 43.00        Pack years: 37        Types: Cigarettes      Smokeless tobacco: Never Used      Tobacco comment: vaping smokeless daily    Substance and Sexual Activity      Alcohol use: No        Alcohol/week: 0.0 oz      Drug u [facility], XR CERVICAL SPINE (2 VIEWS) (CPT=72040), 11/09/2016, 9:24. [address], 6/16/2016, 18:38.      INDICATIONS:  Neck pain, left arm weakness, post fall injury and surgery in October 2015     TECHNIQUE: =====  CONCLUSION:   1. Prior C5-C6 ventral cervical fusion, with evidence for previous fusion hardware located at the C7 level. 2. Disc disease and osteoarthritis of the cervical spine as above. Changes cause central canal and neuroforaminal narrowing.

## 2018-11-26 NOTE — PROGRESS NOTES
Patient present to [facility] ambulatory for med eval.  He has chronic neck pain that radiates to his left shoulder and arm with increasing numbness in his hand.   He states \"I have been slurring my speech for the past few weeks, my wife notices too\" note that p

## 2018-12-31 ENCOUNTER — TELEPHONE (OUTPATIENT)
Dept: PAIN CLINIC | Facility: HOSPITAL | Age: 58
End: 2018-12-31

## 2018-12-31 NOTE — TELEPHONE ENCOUNTER
Patient called requesting something to take before his MRI so he can tolerate lying flat. Please advise. Rotation Flap Text: The defect edges were debeveled with a #15 scalpel blade.  Given the location of the defect, shape of the defect and the proximity to free margins a rotation flap was deemed most appropriate.  Using a sterile surgical marker, an appropriate rotation flap was drawn incorporating the defect and placing the expected incisions within the relaxed skin tension lines where possible.    The area thus outlined was incised deep to adipose tissue with a #15 scalpel blade.  The skin margins were undermined to an appropriate distance in all directions utilizing iris scissors.

## 2019-01-02 RX ORDER — DIAZEPAM 5 MG/1
TABLET ORAL
Qty: 2 TABLET | Refills: 0 | OUTPATIENT
Start: 2019-01-02 | End: 2019-03-11

## 2019-01-03 RX ORDER — GABAPENTIN 600 MG/1
TABLET ORAL
Qty: 90 TABLET | Refills: 2 | Status: SHIPPED | OUTPATIENT
Start: 2019-01-03 | End: 2019-03-22

## 2019-01-22 RX ORDER — NORTRIPTYLINE HYDROCHLORIDE 10 MG/1
CAPSULE ORAL
Qty: 90 CAPSULE | Refills: 0 | Status: SHIPPED | OUTPATIENT
Start: 2019-01-22 | End: 2019-04-18

## 2019-01-23 ENCOUNTER — OFFICE VISIT (OUTPATIENT)
Dept: PAIN CLINIC | Facility: HOSPITAL | Age: 59
End: 2019-01-23
Attending: NURSE PRACTITIONER
Payer: MEDICARE

## 2019-01-23 DIAGNOSIS — G99.2 STENOSIS OF CERVICAL SPINE WITH MYELOPATHY (HCC): Primary | ICD-10-CM

## 2019-01-23 DIAGNOSIS — M48.02 STENOSIS OF CERVICAL SPINE WITH MYELOPATHY (HCC): Primary | ICD-10-CM

## 2019-01-23 PROCEDURE — 99211 OFF/OP EST MAY X REQ PHY/QHP: CPT

## 2019-01-23 RX ORDER — HYDROCODONE BITARTRATE AND ACETAMINOPHEN 10; 325 MG/1; MG/1
1 TABLET ORAL EVERY 6 HOURS PRN
Qty: 120 TABLET | Refills: 0 | Status: SHIPPED | OUTPATIENT
Start: 2019-02-25 | End: 2019-01-28

## 2019-01-23 RX ORDER — HYDROCODONE BITARTRATE AND ACETAMINOPHEN 10; 325 MG/1; MG/1
1 TABLET ORAL EVERY 6 HOURS PRN
Qty: 120 TABLET | Refills: 0 | Status: SHIPPED | OUTPATIENT
Start: 2019-01-27 | End: 2019-01-31

## 2019-01-23 NOTE — PROGRESS NOTES
Patient presents to [facility] ambulatory for med eval.  He has chronic neck pain that radiates to his left arm. He takes gabapentin and norco 4 per day. He was going to do an MRI but his insurance . ANGELA Ornelas in to see patient. See provider notes.

## 2019-01-23 NOTE — CHRONIC PAIN
MEDICATION EVALUATION  HISTORY OF PRESENT ILLNESS:  Ayde Kapoor is a 60year old old male with history of Stenosis of cervical spine with myelopathy (HCC)  (primary encounter diagnosis) who returns for medication evaluation.  Patient continues to report if needed. Disp: 2 tablet Rfl: 0   lidocaine 5 % External Patch Place 1 patch onto the skin daily. Disp: 5 patch Rfl: 2   Acetaminophen (TYLENOL ARTHRITIS EXT RELIEF OR) Take 1 tablet by mouth daily as needed.  Disp:  Rfl:    Vitamin C 500 MG Oral Tab Take strain: Not on file      Food insecurity - worry: Not on file      Food insecurity - inability: Not on file      Transportation needs - medical: Not on file      Transportation needs - non-medical: Not on file    Occupational History      Not on file    To extremities  Sensation (light touch/pinprick/temperature):   Right Lower Extremity:  Normal  Left Lower Extremity:  Normal  Right Upper Extremity: Normal  Left Upper Extremity: Normal  Edema - Absent  Skin - normal     IMAGING:  PROCEDURE:  MRI SPINE CERVI joint hypertrophy. Facet arthrosis. There is mild/moderate bilateral neuroforaminal narrowing. C7-T1:  No significant disc/facet abnormality, spinal stenosis, or foraminal stenosis.        Dictated by (CST): Jose Bell MD on 3/29/2017 at 17:45       Appr

## 2019-01-24 ENCOUNTER — DOCUMENTATION ONLY (OUTPATIENT)
Dept: PAIN CLINIC | Facility: HOSPITAL | Age: 59
End: 2019-01-24

## 2019-01-28 ENCOUNTER — TELEPHONE (OUTPATIENT)
Dept: PAIN CLINIC | Facility: HOSPITAL | Age: 59
End: 2019-01-28

## 2019-01-28 RX ORDER — HYDROCODONE BITARTRATE AND ACETAMINOPHEN 10; 325 MG/1; MG/1
1 TABLET ORAL EVERY 6 HOURS PRN
Qty: 120 TABLET | Refills: 0 | Status: SHIPPED | OUTPATIENT
Start: 2019-01-28 | End: 2019-02-27

## 2019-01-31 RX ORDER — HYDROCODONE BITARTRATE AND ACETAMINOPHEN 10; 325 MG/1; MG/1
1 TABLET ORAL EVERY 6 HOURS PRN
Qty: 120 TABLET | Refills: 0 | Status: SHIPPED | OUTPATIENT
Start: 2019-01-31 | End: 2019-03-02

## 2019-03-11 RX ORDER — DIAZEPAM 5 MG/1
TABLET ORAL
Qty: 2 TABLET | Refills: 0 | Status: SHIPPED | OUTPATIENT
Start: 2019-03-11 | End: 2020-01-16

## 2019-03-13 ENCOUNTER — HOSPITAL ENCOUNTER (OUTPATIENT)
Dept: MRI IMAGING | Facility: HOSPITAL | Age: 59
Discharge: HOME | End: 2019-03-13
Attending: NURSE PRACTITIONER
Payer: MEDICAID

## 2019-03-13 DIAGNOSIS — M47.12 CERVICAL SPONDYLOSIS WITH MYELOPATHY: ICD-10-CM

## 2019-03-13 PROCEDURE — A9575 INJ GADOTERATE MEGLUMI 0.1ML: HCPCS | Performed by: NURSE PRACTITIONER

## 2019-03-13 PROCEDURE — 72156 MRI NECK SPINE W/O & W/DYE: CPT | Performed by: NURSE PRACTITIONER

## 2019-03-14 NOTE — PROGRESS NOTES
Patient presents to [facility] ambulatory for med eval. He has neck pain fnss. He had an MRI done and needs the results. He complains that his left elbow hurts more and his fingers are locking up. He takes norco 4 per day and it helps.   ANGELA Ornelas in to see fabienne

## 2019-03-14 NOTE — CHRONIC PAIN
MEDICATION EVALUATION  HISTORY OF PRESENT ILLNESS:  London Zepeda is a 60year old old male with history of Chronic, continuous use of opioids  (primary encounter diagnosis)  Stenosis of cervical spine with myelopathy ([facility] [address] who returns for medication e NORTRIPTYLINE HCL 10 MG Oral Cap TAKE 1 CAPSULE(10 MG) BY MOUTH EVERY NIGHT Disp: 90 capsule Rfl: 0   gabapentin 600 MG Oral Tab TAKE 1 TABLET(600 MG) BY MOUTH THREE TIMES DAILY Disp: 90 tablet Rfl: 2   lidocaine 5 % External Patch Place 1 patch onto the Spouse name: Not on file      Number of children: Not on file      Years of education: Not on file      Highest education level: Not on file    Occupational History      Not on file    Social Needs      Financial resource strain: Not on file      Food ins Asked    Social History Narrative      The patient does not use an assistive device. .        The patient does live in a home with stairs. ADVANCE CARE PLANNING:    The patient did not wish/unable to name a surrogate decision maker.   PHYSICAL EXAMINATION significant disc/facet abnormality, spinal stenosis, or foraminal stenosis. C3-C4:  No significant disc/facet abnormality, spinal stenosis, or foraminal stenosis. C4-C5:  Slight decrease in disc height with a spondylotic disc bulge.   Mild central cedric patches  - UDS today 3/14/19      -  CPM with Norco PRN.   Discussed with patient the risks of opioid medications including but not limited to dependence, addiction, respiratory depression, and death.  Patient advised not to consume ETOH or operating heavy

## 2019-03-25 RX ORDER — GABAPENTIN 600 MG/1
TABLET ORAL
Qty: 90 TABLET | Refills: 2 | Status: SHIPPED | OUTPATIENT
Start: 2019-03-25 | End: 2020-01-10

## 2019-04-19 RX ORDER — NORTRIPTYLINE HYDROCHLORIDE 10 MG/1
CAPSULE ORAL
Qty: 90 CAPSULE | Refills: 0 | Status: SHIPPED | OUTPATIENT
Start: 2019-04-19 | End: 2019-06-16

## 2019-05-24 ENCOUNTER — OFFICE VISIT (OUTPATIENT)
Dept: PAIN CLINIC | Facility: HOSPITAL | Age: 59
End: 2019-05-24
Attending: NURSE PRACTITIONER
Payer: MEDICAID

## 2019-05-24 VITALS — DIASTOLIC BLOOD PRESSURE: 74 MMHG | SYSTOLIC BLOOD PRESSURE: 126 MMHG | HEART RATE: 65 BPM

## 2019-05-24 DIAGNOSIS — M79.602 LEFT ARM PAIN: ICD-10-CM

## 2019-05-24 DIAGNOSIS — G99.2 STENOSIS OF CERVICAL SPINE WITH MYELOPATHY (HCC): Primary | ICD-10-CM

## 2019-05-24 DIAGNOSIS — M48.02 STENOSIS OF CERVICAL SPINE WITH MYELOPATHY (HCC): Primary | ICD-10-CM

## 2019-05-24 PROCEDURE — 99211 OFF/OP EST MAY X REQ PHY/QHP: CPT

## 2019-05-24 RX ORDER — HYDROCODONE BITARTRATE AND ACETAMINOPHEN 10; 325 MG/1; MG/1
1 TABLET ORAL EVERY 6 HOURS PRN
Qty: 120 TABLET | Refills: 0 | Status: SHIPPED | OUTPATIENT
Start: 2019-06-22 | End: 2019-07-22

## 2019-05-24 RX ORDER — HYDROCODONE BITARTRATE AND ACETAMINOPHEN 10; 325 MG/1; MG/1
1 TABLET ORAL EVERY 6 HOURS PRN
Qty: 120 TABLET | Refills: 0 | Status: SHIPPED | OUTPATIENT
Start: 2019-05-24 | End: 2019-06-23

## 2019-05-24 NOTE — CHRONIC PAIN
MEDICATION EVALUATION  HISTORY OF PRESENT ILLNESS:  Mya Beltran is a 60year old old male with history of Stenosis of cervical spine with myelopathy (HCC)  (primary encounter diagnosis) who returns for medication evaluation.  Patient continues to repo MG Oral Tab Take 500 mg by mouth daily. Disp:  Rfl:    Multiple Vitamins-Minerals (EMERGEN-C VITAMIN C OR) Take 1 tablet by mouth daily.  Disp:  Rfl:         REVIEW OF SYSTEMS:   Bowel/Bladder Incontinence: as above  Coughing/sneezing/straining does not exa file    Tobacco Use      Smoking status: Former Smoker        Packs/day: 1.00        Years: 43.00        Pack years: 37        Types: Cigarettes      Smokeless tobacco: Never Used      Tobacco comment: vaping smokeless daily    Substance and Sexual Activit pronator drift  ROM:   CERVICAL SPINE    Degree Pain   Flexion 20 Yes    Extension 10 yes                       MOTOR EXAMINATION:  UPPER EXTREMITY      LEFT RIGHT   Deltoid 3/5 5/5   Biceps 3/5 5/5   Triceps 3/5 5/5                        Strength 3/5 associated with mild central narrowing. Minimal foraminal narrowing. C6-C7:  Solid anterior interbody fusion. Hardware has been removed from C7.   A left paracentral endplate osteophyte or small disc osteophyte complex partially effaces the left ventral the next medication refill.

## 2019-05-24 NOTE — PROGRESS NOTES
Patient presents to [facility] ambulatory for med eval.  He has chronic neck pain and left arm pain. Has limited strength and rom in his left arm and hand. Cannot make a fist.  He takes 4 norco a day \"if I could get 5 I would be better\". He does a HEP.   APN

## 2019-06-17 RX ORDER — NORTRIPTYLINE HYDROCHLORIDE 10 MG/1
CAPSULE ORAL
Qty: 90 CAPSULE | Refills: 0 | Status: SHIPPED | OUTPATIENT
Start: 2019-06-17 | End: 2020-01-16

## 2019-06-17 RX ORDER — GABAPENTIN 600 MG/1
TABLET ORAL
Qty: 90 TABLET | Refills: 0 | Status: SHIPPED | OUTPATIENT
Start: 2019-06-17 | End: 2020-01-10

## 2019-07-17 RX ORDER — GABAPENTIN 600 MG/1
TABLET ORAL
Qty: 90 TABLET | Refills: 2 | Status: SHIPPED | OUTPATIENT
Start: 2019-07-17 | End: 2019-10-05

## 2019-07-18 ENCOUNTER — OFFICE VISIT (OUTPATIENT)
Dept: PAIN CLINIC | Facility: HOSPITAL | Age: 59
End: 2019-07-18
Attending: NURSE PRACTITIONER
Payer: MEDICAID

## 2019-07-18 DIAGNOSIS — G99.2 STENOSIS OF CERVICAL SPINE WITH MYELOPATHY (HCC): Primary | ICD-10-CM

## 2019-07-18 DIAGNOSIS — M48.02 STENOSIS OF CERVICAL SPINE WITH MYELOPATHY (HCC): Primary | ICD-10-CM

## 2019-07-18 PROCEDURE — 99211 OFF/OP EST MAY X REQ PHY/QHP: CPT

## 2019-07-18 RX ORDER — HYDROCODONE BITARTRATE AND ACETAMINOPHEN 10; 325 MG/1; MG/1
1 TABLET ORAL EVERY 6 HOURS PRN
Qty: 120 TABLET | Refills: 0 | Status: SHIPPED | OUTPATIENT
Start: 2019-08-21 | End: 2019-09-20

## 2019-07-18 RX ORDER — HYDROCODONE BITARTRATE AND ACETAMINOPHEN 10; 325 MG/1; MG/1
1 TABLET ORAL EVERY 6 HOURS PRN
Qty: 120 TABLET | Refills: 0 | Status: SHIPPED | OUTPATIENT
Start: 2019-07-22 | End: 2019-08-21

## 2019-07-18 NOTE — PROGRESS NOTES
Patient presents to [facility] ambulatory for med dorothea.  He has chronic neck pain radiating down his left arm with n/t/w. He had an EMG done in [place] and brought the results with him.   He takes norco, when asked how many he takes a day he stated \"however m

## 2019-07-18 NOTE — CHRONIC PAIN
MEDICATION EVALUATION  HISTORY OF PRESENT ILLNESS:  Elyse Hector is a 60year old old male with history of Stenosis of cervical spine with myelopathy (HCC)  (primary encounter diagnosis) who returns for medication evaluation.  Patient continues to repo (TYLENOL ARTHRITIS EXT RELIEF OR) Take 1 tablet by mouth daily as needed. Disp:  Rfl:    Vitamin C 500 MG Oral Tab Take 500 mg by mouth daily. Disp:  Rfl:    Multiple Vitamins-Minerals (EMERGEN-C VITAMIN C OR) Take 1 tablet by mouth daily.  Disp:  Rfl: Inability: Not on file      Transportation needs:        Medical: Not on file        Non-medical: Not on file    Tobacco Use      Smoking status: Former Smoker        Packs/day: 1.00        Years: 43.00        Pack years: 37        Types: Cigarettes      S General: Alert and oriented x3,   Affect:  NAD  Gait: Normal;  cane user - No  Spine: Normal   No pronator drift  ROM:   CERVICAL SPINE    Degree Pain   Flexion 20 Yes    Extension 10 yes                       MOTOR EXAMINATION:  UPPER EXTREMITY      LEF intact. C5-C6:  Ferromagnetic artifact related to ACDF at C5-6. Posterior bony remodeling/pondylosis associated with mild central narrowing. Minimal foraminal narrowing. C6-C7:  Solid anterior interbody fusion. Hardware has been removed from C7.   A le respiratory depression, and death.  Patient advised not to consume ETOH or operating heavy machinery or vehicles while taking this medication.  Patient verbalized understanding.  Patient updated regarding refill policy.  He verbalized understanding.     - S

## 2019-07-23 ENCOUNTER — DOCUMENTATION ONLY (OUTPATIENT)
Dept: PAIN CLINIC | Facility: HOSPITAL | Age: 59
End: 2019-07-23

## 2019-07-23 NOTE — PROGRESS NOTES
Procedure code 55241---uxahkoegy for 08/02/2019 PENDING APPROVAL     PA needed via [employer]    All clinical notes submitted     Procedure approved via [employer] # [id number]    Valid from 07/23/2019--09/21/2019    [facility]

## 2019-08-05 ENCOUNTER — TELEPHONE (OUTPATIENT)
Dept: PAIN CLINIC | Facility: HOSPITAL | Age: 59
End: 2019-08-05

## 2019-08-16 RX ORDER — NORTRIPTYLINE HYDROCHLORIDE 10 MG/1
CAPSULE ORAL
Qty: 90 CAPSULE | Refills: 0 | Status: SHIPPED | OUTPATIENT
Start: 2019-08-16 | End: 2019-11-12

## 2019-09-13 ENCOUNTER — OFFICE VISIT (OUTPATIENT)
Dept: PAIN CLINIC | Facility: HOSPITAL | Age: 59
End: 2019-09-13
Attending: NURSE PRACTITIONER
Payer: MEDICAID

## 2019-09-13 VITALS
TEMPERATURE: 97 F | SYSTOLIC BLOOD PRESSURE: 139 MMHG | RESPIRATION RATE: 20 BRPM | DIASTOLIC BLOOD PRESSURE: 88 MMHG | HEART RATE: 90 BPM

## 2019-09-13 DIAGNOSIS — F11.90 CHRONIC, CONTINUOUS USE OF OPIOIDS: ICD-10-CM

## 2019-09-13 DIAGNOSIS — G99.2 STENOSIS OF CERVICAL SPINE WITH MYELOPATHY (HCC): Primary | ICD-10-CM

## 2019-09-13 DIAGNOSIS — M79.602 LEFT ARM PAIN: ICD-10-CM

## 2019-09-13 DIAGNOSIS — M48.02 STENOSIS OF CERVICAL SPINE WITH MYELOPATHY (HCC): Primary | ICD-10-CM

## 2019-09-13 PROCEDURE — 99211 OFF/OP EST MAY X REQ PHY/QHP: CPT

## 2019-09-13 RX ORDER — HYDROCODONE BITARTRATE AND ACETAMINOPHEN 10; 325 MG/1; MG/1
1 TABLET ORAL EVERY 6 HOURS PRN
Qty: 120 TABLET | Refills: 0 | Status: SHIPPED | OUTPATIENT
Start: 2019-10-20 | End: 2019-11-19

## 2019-09-13 RX ORDER — HYDROCODONE BITARTRATE AND ACETAMINOPHEN 10; 325 MG/1; MG/1
1 TABLET ORAL EVERY 6 HOURS PRN
Qty: 120 TABLET | Refills: 0 | Status: SHIPPED | OUTPATIENT
Start: 2019-09-20 | End: 2019-10-20

## 2019-09-13 NOTE — CHRONIC PAIN
MEDICATION EVALUATION  HISTORY OF PRESENT ILLNESS:  Loman Rinne is a 60year old old male with history of Stenosis of cervical spine with myelopathy (HCC)  (primary encounter diagnosis)  Left arm pain  Chronic, continuous use of opioids who returns f Disp: 90 tablet Rfl: 2   atorvastatin 40 MG Oral Tab TK 1 T PO D Disp:  Rfl: 10   diazepam 5 MG Oral Tab Take one tablet 45 minutes prior to MRI, may repeat once if needed.  Disp: 2 tablet Rfl: 0   lidocaine 5 % External Patch Place 1 patch onto the skin da name: Not on file      Number of children: Not on file      Years of education: Not on file      Highest education level: Not on file    Occupational History      Not on file    Social Needs      Financial resource strain: Not on file      Food insecurity: Asked    Social History Narrative      The patient does not use an assistive device. .        The patient does live in a home with stairs. ADVANCE CARE PLANNING:    The patient did not wish/unable to name a surrogate decision maker.   PHYSICAL EXAMINATION CERVICAL DISC LEVELS:  C2-C3:  No significant disc/facet abnormality, spinal stenosis, or foraminal stenosis. C3-C4:  No significant disc/facet abnormality, spinal stenosis, or foraminal stenosis.     C4-C5:  Slight decrease in disc height with a spond ([facility] [address]  (primary encounter diagnosis)  Left arm pain  Chronic, continuous use of opioids.      - recommended FORTINO, he is scheduled on 10/4/19     -medication refill today  -continue with HEP   - refill gabapentin   -lidocaine patches  - UDS: 3/14/19  - NA: u
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
## 2019-10-02 ENCOUNTER — DOCUMENTATION ONLY (OUTPATIENT)
Dept: PAIN CLINIC | Facility: HOSPITAL | Age: 59
End: 2019-10-02

## 2019-10-02 NOTE — PROGRESS NOTES
Procedure code [id number] for 10/4/19    Spoke with Sai Davenport @ [id number] [address]oss @ # [phone]    Case has been approved    Auth # [id number]    Valid from 10/2/19--12/1/19    Order form sent to the surgery center, confirmation rcv'd

## 2019-10-04 ENCOUNTER — APPOINTMENT (OUTPATIENT)
Dept: GENERAL RADIOLOGY | Facility: HOSPITAL | Age: 59
End: 2019-10-04
Attending: ANESTHESIOLOGY
Payer: MEDICAID

## 2019-10-04 ENCOUNTER — HOSPITAL ENCOUNTER (OUTPATIENT)
Facility: HOSPITAL | Age: 59
Setting detail: HOSPITAL OUTPATIENT SURGERY
Discharge: HOME | End: 2019-10-04
Attending: ANESTHESIOLOGY | Admitting: ANESTHESIOLOGY
Payer: MEDICAID

## 2019-10-04 VITALS
TEMPERATURE: 98 F | HEIGHT: 65 IN | BODY MASS INDEX: 29 KG/M2 | RESPIRATION RATE: 22 BRPM | OXYGEN SATURATION: 96 % | SYSTOLIC BLOOD PRESSURE: 137 MMHG | DIASTOLIC BLOOD PRESSURE: 97 MMHG | HEART RATE: 85 BPM

## 2019-10-04 PROCEDURE — 3E0R33Z INTRODUCTION OF ANTI-INFLAMMATORY INTO SPINAL CANAL, PERCUTANEOUS APPROACH: ICD-10-PCS | Performed by: ANESTHESIOLOGY

## 2019-10-04 RX ORDER — LIDOCAINE HYDROCHLORIDE 10 MG/ML
INJECTION, SOLUTION EPIDURAL; INFILTRATION; INTRACAUDAL; PERINEURAL AS NEEDED
Status: DISCONTINUED | OUTPATIENT
Start: 2019-10-04 | End: 2019-10-04 | Stop reason: HOSPADM

## 2019-10-04 RX ORDER — DEXAMETHASONE SODIUM PHOSPHATE 10 MG/ML
INJECTION, SOLUTION INTRAMUSCULAR; INTRAVENOUS AS NEEDED
Status: DISCONTINUED | OUTPATIENT
Start: 2019-10-04 | End: 2019-10-04 | Stop reason: HOSPADM

## 2019-10-04 NOTE — OPERATIVE REPORT
PREOP: Neck pain with C5-6 radiculopathy/failed neck surgery syndrome    POSTOP: Same    PROCEDURE: Interlaminar cervical epidural steroid injection C5-6    SURGEON: Mau Emerson MD    ANESTHESIA: Local    COMPLICATIONS:  None      PROCEDURE: The patie

## 2019-10-04 NOTE — H&P
History & Physical Examination    Patient Name: Mya Beltran  MRN: [id number]  [facility]: [id number]  YOB: 1960    Diagnosis: Neck pain with left upper extremity radiculopathy and postlaminectomy syndrome  Present Illness: Rosana Wilfredo is a 63-year Taking   Multiple Vitamins-Minerals (EMERGEN-C VITAMIN C OR) Take 1 tablet by mouth daily. Disp:  Rfl:  Taking       No current facility-administered medications for this encounter.      Allergies:   Ragweed                 ITCHING    Past Medical History:

## 2019-10-07 RX ORDER — GABAPENTIN 600 MG/1
TABLET ORAL
Qty: 90 TABLET | Refills: 2 | Status: SHIPPED | OUTPATIENT
Start: 2019-10-07 | End: 2019-12-31

## 2019-11-12 ENCOUNTER — OFFICE VISIT (OUTPATIENT)
Dept: PAIN CLINIC | Facility: HOSPITAL | Age: 59
End: 2019-11-12
Attending: NURSE PRACTITIONER
Payer: MEDICAID

## 2019-11-12 VITALS — SYSTOLIC BLOOD PRESSURE: 137 MMHG | DIASTOLIC BLOOD PRESSURE: 84 MMHG | HEART RATE: 77 BPM

## 2019-11-12 DIAGNOSIS — M48.02 STENOSIS OF CERVICAL SPINE WITH MYELOPATHY (HCC): Primary | ICD-10-CM

## 2019-11-12 DIAGNOSIS — F11.90 CHRONIC, CONTINUOUS USE OF OPIOIDS: ICD-10-CM

## 2019-11-12 DIAGNOSIS — M79.602 LEFT ARM PAIN: ICD-10-CM

## 2019-11-12 DIAGNOSIS — G99.2 STENOSIS OF CERVICAL SPINE WITH MYELOPATHY (HCC): Primary | ICD-10-CM

## 2019-11-12 PROCEDURE — 99211 OFF/OP EST MAY X REQ PHY/QHP: CPT

## 2019-11-12 RX ORDER — HYDROCODONE BITARTRATE AND ACETAMINOPHEN 10; 325 MG/1; MG/1
1 TABLET ORAL EVERY 6 HOURS PRN
Qty: 120 TABLET | Refills: 0 | Status: SHIPPED | OUTPATIENT
Start: 2019-12-19 | End: 2020-01-10

## 2019-11-12 RX ORDER — HYDROCODONE BITARTRATE AND ACETAMINOPHEN 10; 325 MG/1; MG/1
1 TABLET ORAL EVERY 6 HOURS PRN
Qty: 120 TABLET | Refills: 0 | Status: SHIPPED | OUTPATIENT
Start: 2019-11-19 | End: 2019-12-16

## 2019-11-12 NOTE — CHRONIC PAIN
MEDICATION EVALUATION  HISTORY OF PRESENT ILLNESS:  Mya Beltran is a 60year old old male with history of Stenosis of cervical spine with myelopathy (HCC)  (primary encounter diagnosis)  Left arm pain  Chronic, continuous use of opioids who returns f HCL 10 MG Oral Cap TAKE 1 CAPSULE(10 MG) BY MOUTH EVERY NIGHT 90 capsule 0   • GABAPENTIN 600 MG Oral Tab TAKE 1 TABLET(600 MG) BY MOUTH THREE TIMES DAILY 90 tablet 2   • atorvastatin 40 MG Oral Tab TK 1 T PO D  10   • diazepam 5 MG Oral Tab Take one table Onset   • Other (Other) Father         Renal transplant   • Diabetes Paternal Grandmother        SOCIAL HISTORY:  Social History    Socioeconomic History      Marital status:       Spouse name: Not on file      Number of children: Not on file      Y Yes          occ        Stress Concern: Not Asked        Weight Concern: Not Asked        Special Diet: Not Asked        Back Care: Not Asked        Exercise: Yes          HEP 3 times week        Bike Helmet: Not Asked        Seat Belt: Not Asked        Se Chiari malformation. PARASPINAL AREA:     Normal with no visible mass. BONES:             No fracture, pars defect, or osseous lesion. CORD:  Normal caliber, contour, and signal intensity. OTHER:             Negative.      CERVICAL DISC LEVELS: at the elbow (cubital tunnel syndrome) on the left.    [place] PHYSICIAN MONITORING PROGRAM REVIEWED  Yes    ASSESSMENT AND PLAN:    Clinton Tony is a 60year old  male, with  History of Stenosis of cervical spine with myelopathy (HCC)  (primary encounter d
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
## 2019-11-14 RX ORDER — NORTRIPTYLINE HYDROCHLORIDE 10 MG/1
CAPSULE ORAL
Qty: 90 CAPSULE | Refills: 0 | Status: SHIPPED | OUTPATIENT
Start: 2019-11-14 | End: 2020-02-11

## 2019-12-16 ENCOUNTER — TELEPHONE (OUTPATIENT)
Dept: PAIN CLINIC | Facility: HOSPITAL | Age: 59
End: 2019-12-16

## 2019-12-16 RX ORDER — HYDROCODONE BITARTRATE AND ACETAMINOPHEN 10; 325 MG/1; MG/1
1 TABLET ORAL EVERY 6 HOURS PRN
Qty: 120 TABLET | Refills: 0 | Status: SHIPPED | OUTPATIENT
Start: 2019-12-18 | End: 2020-01-10

## 2019-12-16 NOTE — TELEPHONE ENCOUNTER
Patient called stating that his pharmacy did not have his script for norco for December. Called [facility] and they did not have it. Also noted that pt filled his 11-19 script on 11-15, 4 days early. Per pharmacist insurance covered it.   we were not noti

## 2019-12-31 RX ORDER — GABAPENTIN 600 MG/1
TABLET ORAL
Qty: 90 TABLET | Refills: 2 | Status: SHIPPED | OUTPATIENT
Start: 2019-12-31 | End: 2020-01-10

## 2020-01-16 ENCOUNTER — OFFICE VISIT (OUTPATIENT)
Dept: PAIN CLINIC | Facility: HOSPITAL | Age: 60
End: 2020-01-16
Attending: ANESTHESIOLOGY
Payer: MEDICAID

## 2020-01-16 VITALS
RESPIRATION RATE: 18 BRPM | BODY MASS INDEX: 28.13 KG/M2 | SYSTOLIC BLOOD PRESSURE: 132 MMHG | DIASTOLIC BLOOD PRESSURE: 88 MMHG | HEIGHT: 66 IN | WEIGHT: 175 LBS | HEART RATE: 78 BPM

## 2020-01-16 DIAGNOSIS — G99.2 STENOSIS OF CERVICAL SPINE WITH MYELOPATHY (HCC): Primary | ICD-10-CM

## 2020-01-16 DIAGNOSIS — M48.02 STENOSIS OF CERVICAL SPINE WITH MYELOPATHY (HCC): Primary | ICD-10-CM

## 2020-01-16 PROCEDURE — 99211 OFF/OP EST MAY X REQ PHY/QHP: CPT

## 2020-01-16 RX ORDER — HYDROCODONE BITARTRATE AND ACETAMINOPHEN 10; 325 MG/1; MG/1
1 TABLET ORAL EVERY 6 HOURS PRN
Qty: 120 TABLET | Refills: 0 | Status: SHIPPED | OUTPATIENT
Start: 2020-01-18 | End: 2020-02-17

## 2020-01-16 RX ORDER — HYDROCODONE BITARTRATE AND ACETAMINOPHEN 10; 325 MG/1; MG/1
1 TABLET ORAL EVERY 6 HOURS PRN
Qty: 120 TABLET | Refills: 0 | Status: SHIPPED | OUTPATIENT
Start: 2020-02-17 | End: 2020-03-12

## 2020-01-16 NOTE — CHRONIC PAIN
MEDICATION EVALUATION  HISTORY OF PRESENT ILLNESS:  Enzo Elizalde is a 60year old old male with history of Stenosis of cervical spine with myelopathy (HCC)  (primary encounter diagnosis) who returns for medication evaluation.  Since we last saw him he (TYLENOL ARTHRITIS EXT RELIEF OR) Take 1 tablet by mouth daily as needed. • Vitamin C 500 MG Oral Tab Take 500 mg by mouth daily. • Multiple Vitamins-Minerals (EMERGEN-C VITAMIN C OR) Take 1 tablet by mouth daily.         REVIEW OF SYSTEMS:   Bowel/ Not on file      Food insecurity:        Worry: Not on file        Inability: Not on file      Transportation needs:        Medical: Not on file        Non-medical: Not on file    Tobacco Use      Smoking status: Former Smoker        Packs/day: 1.00 CARE PLANNING:    The patient did not wish/unable to name a surrogate decision maker.   PHYSICAL EXAMINATION:   01/16/20  1107   BP: 132/88   Pulse: 78   Resp: 18      General: Alert and oriented x3,   Affect:  NAD  Gait: Normal;  cane user - No  Spine: Nor or foraminal stenosis. C4-C5:  Slight decrease in disc height with a spondylotic disc bulge. Mild central narrowing. No foraminal narrowing. Facet joints intact. C5-C6:  Ferromagnetic artifact related to ACDF at C5-6.   Posterior bony remodeling/pond clinics     -medication refill today  -continue with HEP   - refill gabapentin   -lidocaine patches  - UDS: 3/14/19  - NA: updated 7/18/19    -  CPM with Norco PRN.   Discussed with patient the risks of opioid medications including but not limited to depend

## 2020-02-11 RX ORDER — NORTRIPTYLINE HYDROCHLORIDE 10 MG/1
CAPSULE ORAL
Qty: 90 CAPSULE | Refills: 0 | Status: SHIPPED | OUTPATIENT
Start: 2020-02-11 | End: 2020-05-14

## 2020-03-16 ENCOUNTER — OFFICE VISIT (OUTPATIENT)
Dept: PAIN CLINIC | Facility: HOSPITAL | Age: 60
End: 2020-03-16
Attending: NURSE PRACTITIONER
Payer: MEDICAID

## 2020-03-16 VITALS — DIASTOLIC BLOOD PRESSURE: 68 MMHG | SYSTOLIC BLOOD PRESSURE: 134 MMHG | HEART RATE: 75 BPM

## 2020-03-16 DIAGNOSIS — M48.02 STENOSIS OF CERVICAL SPINE WITH MYELOPATHY (HCC): Primary | ICD-10-CM

## 2020-03-16 DIAGNOSIS — G99.2 STENOSIS OF CERVICAL SPINE WITH MYELOPATHY (HCC): Primary | ICD-10-CM

## 2020-03-16 DIAGNOSIS — Z79.891 ENCOUNTER FOR MONITORING OPIOID MAINTENANCE THERAPY: ICD-10-CM

## 2020-03-16 DIAGNOSIS — Z51.81 ENCOUNTER FOR MONITORING OPIOID MAINTENANCE THERAPY: ICD-10-CM

## 2020-03-16 DIAGNOSIS — M79.602 LEFT ARM PAIN: ICD-10-CM

## 2020-03-16 LAB
AMPHET UR QL SCN: NEGATIVE
BARBITURATES UR QL SCN: NEGATIVE
BENZODIAZ UR QL SCN: NEGATIVE
CANNABINOIDS UR QL SCN: NEGATIVE
COCAINE UR QL: NEGATIVE
MDMA UR QL SCN: NEGATIVE
METHADONE UR QL SCN: NEGATIVE
OXYCODONE UR QL SCN: NEGATIVE
PCP UR QL SCN: NEGATIVE

## 2020-03-16 PROCEDURE — 99211 OFF/OP EST MAY X REQ PHY/QHP: CPT

## 2020-03-16 PROCEDURE — 80361 OPIATES 1 OR MORE: CPT | Performed by: NURSE PRACTITIONER

## 2020-03-16 PROCEDURE — 80307 DRUG TEST PRSMV CHEM ANLYZR: CPT | Performed by: NURSE PRACTITIONER

## 2020-03-16 RX ORDER — HYDROCODONE BITARTRATE AND ACETAMINOPHEN 10; 325 MG/1; MG/1
1 TABLET ORAL EVERY 6 HOURS PRN
Qty: 120 TABLET | Refills: 0 | Status: SHIPPED | OUTPATIENT
Start: 2020-03-18 | End: 2020-04-17

## 2020-03-16 RX ORDER — GABAPENTIN 600 MG/1
TABLET ORAL
COMMUNITY
Start: 2020-03-02 | End: 2020-03-30

## 2020-03-16 RX ORDER — HYDROCODONE BITARTRATE AND ACETAMINOPHEN 10; 325 MG/1; MG/1
1 TABLET ORAL EVERY 6 HOURS PRN
Qty: 120 TABLET | Refills: 0 | Status: SHIPPED | OUTPATIENT
Start: 2020-04-17 | End: 2020-05-17

## 2020-03-16 RX ORDER — PREGABALIN 75 MG/1
CAPSULE ORAL 3 TIMES DAILY
COMMUNITY
Start: 2020-03-07

## 2020-03-16 NOTE — PROGRESS NOTES
Patient present to [facility] ambulatory for med eval.  He has chronic left shoulder and arm pain and neck pain. He takes norco up to 4 per day which helps. APN Johnson in to see patient. See provider notes.

## 2020-03-16 NOTE — CHRONIC PAIN
MEDICATION EVALUATION  HISTORY OF PRESENT ILLNESS:  Brisa Hale is a 60year old old male with history of Stenosis of cervical spine with myelopathy (HCC)  (primary encounter diagnosis)  Left arm pain  Encounter for monitoring opioid maintenance ther the skin daily. 5 patch 2   • Acetaminophen (TYLENOL ARTHRITIS EXT RELIEF OR) Take 1 tablet by mouth daily as needed. • Vitamin C 500 MG Oral Tab Take 500 mg by mouth daily.      • Multiple Vitamins-Minerals (EMERGEN-C VITAMIN C OR) Take 1 tablet by elie Social Needs      Financial resource strain: Not on file      Food insecurity:        Worry: Not on file        Inability: Not on file      Transportation needs:        Medical: Not on file        Non-medical: Not on file    Tobacco Use      Smoking status does live in a home with stairs. ADVANCE CARE PLANNING:    The patient did not wish/unable to name a surrogate decision maker.   PHYSICAL EXAMINATION:   03/16/20  1055   BP: 134/68   Pulse: 75      General: Alert and oriented x3,   Affect:  NAD  Gait: No disc/facet abnormality, spinal stenosis, or foraminal stenosis. C4-C5:  Slight decrease in disc height with a spondylotic disc bulge. Mild central narrowing. No foraminal narrowing. Facet joints intact.   C5-C6:  Ferromagnetic artifact related to ACDF visit and was given a warning.   -medication refill today  -continue with HEP   - stop gabapentin   -lidocaine patches  - UDS: update today 3/16/20  - NA: update today 3/16/20    Discussed with patient the risks of opioid medications including but not [place]

## 2020-03-20 LAB
OPIATES, UR, 6-ACETYLMORPHINE: <10 NG/ML
OPIATES, URINE, CODEINE: <20 NG/ML
OPIATES, URINE, HYDROCODONE: 1827 NG/ML
OPIATES, URINE, HYDROMORPHONE: 20 NG/ML
OPIATES, URINE, MORPHINE: <20 NG/ML
OPIATES, URINE, NORHYDROCODONE: 3045 NG/ML
OPIATES, URINE, NOROXYCODONE: <20 NG/ML
OPIATES, URINE, NOROXYMORPHONE: <20 NG/ML
OPIATES, URINE, OXYCODONE: <20 NG/ML
OPIATES, URINE, OXYMORPHONE: <20 NG/ML

## 2020-03-30 RX ORDER — GABAPENTIN 600 MG/1
TABLET ORAL
Qty: 90 TABLET | Refills: 0 | Status: SHIPPED | OUTPATIENT
Start: 2020-03-30

## 2020-05-14 RX ORDER — NORTRIPTYLINE HYDROCHLORIDE 10 MG/1
CAPSULE ORAL
Qty: 90 CAPSULE | Refills: 0 | Status: SHIPPED | OUTPATIENT
Start: 2020-05-14

## 2020-08-11 RX ORDER — NORTRIPTYLINE HYDROCHLORIDE 10 MG/1
CAPSULE ORAL
Qty: 90 CAPSULE | Refills: 0 | OUTPATIENT
Start: 2020-08-11

## 2020-09-02 RX ORDER — NORTRIPTYLINE HYDROCHLORIDE 10 MG/1
CAPSULE ORAL
Qty: 90 CAPSULE | Refills: 0 | OUTPATIENT
Start: 2020-09-02

## (undated) DEVICE — FLEXIBLE ADHESIVE BANDAGE,KNUCKLE: Brand: CURITY

## (undated) DEVICE — GAMMEX® PI HYBRID SIZE 7, STERILE POWDER-FREE SURGICAL GLOVE, POLYISOPRENE AND NEOPRENE BLEND: Brand: GAMMEX

## (undated) DEVICE — CHLORAPREP ORANGE TINT 10.5ML

## (undated) DEVICE — PERIFIX® 18 GA. X 3-1/2 IN. (90 MM) TUOHY, 5 ML GLASS LUER LOCK LOR TRAY (KIT): Brand: PERIFIX®

## (undated) NOTE — MR AVS SNAPSHOT
[address]  [phone]  [phone]               Thank you for choosing us for your health care visit [facility].   We are glad to serve you and happy to provide you with discharge instructions in [employer] by going to Visits < Admission Summaries. If you've been to the Emergency Department or your doctor's office, you can view your past visit information in [employer] by going to Visits < Visit Summaries. [employer] questions?

## (undated) NOTE — MR AVS SNAPSHOT
[address]  [phone]  [phone]               Thank you for choosing us for your health care visit [facility].   We are glad to serve you and happy to provide you with Please arrive at your scheduled appointment time. Wear comfortable, loose fitting clothing.             Jan 26, 2017 11:15 AM   [facility] Visit By Therapist with Flavio Howard, [address] [facility]

## (undated) NOTE — MR AVS SNAPSHOT
[address]  [phone]  [phone]               Thank you for choosing us for your health care visit with Adriana Toledo PT.   We are glad to serve you and happy to provide you with Please arrive at your scheduled appointment time. Wear comfortable, loose fitting clothing.             Jan 30, 2017 12:00 PM   [facility] Visit By Therapist with Renetta Gold, [address] [facility]

## (undated) NOTE — MR AVS SNAPSHOT
[address]  [phone]  [phone]               Thank you for choosing us for your health care visit with Kishan Ta, PT.   We are glad to serve you and happy to provide you with [facility] ([address] [place] [address])    [address] [address]   [phone]           Please arrive at your scheduled appointment time. Wear comfortable, loose fitting clothing.               [place]

## (undated) NOTE — MR AVS SNAPSHOT
[address]  [facility] [address] [id number]  [phone]  [phone]               Thank you for choosing us for your health care visit with Gerri Larkin MD.  We are glad to serve you and happy to provide you wi Carisoprodol 350 MG Tabs   Take 350 mg by mouth 3 (three) times daily as needed for Muscle spasms. Commonly known as:  SOMA           gabapentin 300 MG Caps   Take 1 capsule (300 mg total) by mouth 3 (three) times daily.    Commonly known as:  N You can access your MyChart to more actively manage your health care and view more details from this visit by going to [email or web address].   If you've recently had a stay at the Hospital you can access your discharge instructions in [address] by martinez You don’t need to join a gym. Home exercises work great.  Put more priority on exercise in your life                    Visit [facility] online at  [email or web address]